# Patient Record
(demographics unavailable — no encounter records)

---

## 2024-10-10 NOTE — ASSESSMENT
[FreeTextEntry1] : Bee has an acute L3 compression fracture in the setting of L3-4 spinal stenosis.  She has a history of an L4-5 fusion.  We do lengthy talk today in the office.  She is obviously not a surgical candidate based on her recent cardiac events.  I am recommending we treat her with a orthosis.  I think with brace immobilization in time the fracture should go on to heal itself.  In addition to that I have prescribed her some tramadol to help with the pain.  We talked about heat ice and activity modification.  In terms of her overall lumbar spinal stenosis and claudication that something that we can deal with surgically in the future when she is an appropriate candidate.  She is comfortable this plan.  All of her questions were addressed.

## 2024-10-10 NOTE — PHYSICAL EXAM
[de-identified] : On exam she is healthy appearing but looks uncomfortable.  Sits and rises slowly.  Tenderness to percussion midline spine.  Range of motion about 50% of normal.  Grossly the strength in her legs is preserved. [de-identified] : AP and lateral lumbar spine x-rays demonstrate postsurgical changes at L4-5 with a solid fusion pedicle screws and interbody cage.  There is approximately 20% compression fracture of the superior endplate of L3 above.

## 2024-10-10 NOTE — HISTORY OF PRESENT ILLNESS
[de-identified] : Bee returns today for follow-up.  I saw her earlier this year at the old practice.  At the time of seeing her for back pain and bilateral thigh pain.  She was diagnosed with transitional stenosis at L3-4 having undergone a previous L4-5 fusion many years ago.  We are treating her conservatively.  She was considering surgical intervention when in May she had a heart attack and needed cardiac catheterization.  She is obviously not a candidate for any elective surgery at this point in time.  She is here today for a new complaint.  About 3 or 4 weeks ago she was helping a friend when she fell backwards landing on her back.  She had immediate onset of significant increase in pain in her back.  She was diagnosed with a L3 compression fracture.  She has been managing it with some oral medications but it has been persistently painful.  This was the first opportunity for evaluation.  Her pain is back pain.  No recent changes in her bowel or bladder habits.  No change in her thigh or groin pain.  No new numbness tingling or weakness in the lower extremities.

## 2024-11-22 NOTE — DATA REVIEWED
[FreeTextEntry1] : : CT LUMBAR SPINE  ORDER #: [id number] CC: ; ; ; End of cc's  CT LUMBAR SPINE  CLINICAL INFORMATION: pain to lumbar spine  TECHNIQUE: Noncontrast CT. Axial acquisition. Sagittal and coronal reformations.  COMPARISON: No prior studies for comparison  FINDINGS: SPINAL COLUMN: Straightening of the normal lumbar lordosis. Mild grade 1 retrolisthesis T11 on T12 and L3 on L4. Prior L4-5 laminectomy, discectomy, and posterior fusion/stabilization. Resection of the bilateral L4-5 facet joints. L4-5 disc prosthesis. Pedicle screws bilaterally at L4 and L5 attaching to short interlocking rods. Mild fracture superior endplate of L3 eccentric towards the right which appears to have an acute component.  DISC LEVELS: T11-12: Mild grade 1 retrolisthesis. No significant canal or neural foramina narrowing. T12/L1: No disc bulge or protrusion. No canal or neural foramina narrowing. L1-2: No disc bulge or protrusion. No canal or neural foramina narrowing. L2-3: Mild disc bulge slightly more focal in the right foraminal region. Mild canal and right greater than left lateral recess and neural foramina narrowing. L3-4: Mild grade 1 retrolisthesis. Diffuse bulge. Mild facet DJD. Moderate canal, moderate right and mild left neural foramina narrowing L4-5: Prior discectomy and laminectomy. Capacious spinal canal. No gross recurrent disc. L5-S1: Mild bulge. Moderate right greater than left facet DJD. Mild to moderate canal and mild left greater than right neural foramina narrowing  OTHER: Visualized sacrum appears intact.  IMPRESSION: Mild compression fracture superior endplate of L3 eccentric to the right which appears to have an acute component. Correlate with history and site of pain. Prior L4-5 laminectomy, discectomy, and posterior fusion/stabilization. Multilevel disc bulges as described. Please see report for detail  --- End of Report ---  Electronically Signed: ____________________________________________ Jarvis Guevara MD 09/30/24 [id number]

## 2024-11-22 NOTE — ASSESSMENT
[FreeTextEntry1] : Ms. LUZ MARIA MACDONALD is a 77 year F on BRLINITA suffering from right sided low back and thigh pain, that based upon today's subjective complaints, physical examination, and CT review, is likely multifactorial with element of L3 radiculopathy on the RIGHT as well as possible element due to compression fracture  >> Medications  Chronic opioid use for non-malignant pain, in particular at high doses would not be recommended since it can potentially lead to hyperalgesia (hypersensitivity), tolerance and addiction.    Not amenable to gabapentin - causes fatigue   >> Interventions   Consider for RIGHT L3/4 LESI pending MRI  needs clearance to HOLD Brilinta    >> Therapy and Other Modalities   Continue with daily home stretching regimen  >> Imaging and Other Studies  MRI Lumbar Spine  >> Consults  None ordered

## 2024-11-22 NOTE — HISTORY OF PRESENT ILLNESS
[Back Pain] : back pain [___ mths] : [unfilled] month(s) ago [Constant] : constant [8] : a maximum pain level of 8/10 [Sharp] : sharp [Dull] : dull [Aching] : aching [Throbbing] : throbbing [Shooting] : shooting [Electric] : electric [Sitting] : sitting [Standing] : standing [Walking] : walking [Transitioning] : transitioning [Bending] : bending [Lifting] : lifting [Laying] : laying [Medications] : medications [Heat] : heat [Rest] : rest [FreeTextEntry1] : HPI - Kandy Miguel, a 77-year-old female, on [place]RLINITA  presents today with a primary complaint of back pain, specifically localized to the lumbar spine following a fracture in September 2024. The pain has been persistent for the past three months and is described as constant. She rates her pain consistently at 8/10 on the pain scale, characterizing it as sharp, dull, aching, throbbing, shooting, and electric. The pain is exacerbated by activities such as sitting, standing, walking, transitioning, bending, and lifting. However, it is somewhat alleviated by laying down, taking medications like Tramadol for intense pain, heat application, and rest. She usually resorts to Tylenol for general pain management. Imaging has been done, and she was referred to this clinic by Dr. Whitfield.  The pain does not radiate into other areas, and she denies any numbness, weakness, bowel or bladder incontinence, or saddle/perineal anesthesia, indicating no red flag symptoms. She has maintained regular bowel movements. This ongoing pain significantly impacts her daily life, hindering her ability to perform routine housework and other activities. Despite undergoing six weeks of provider-directed treatments, including a stretching regimen, her symptoms have persisted and progressed over the last three months. This chronic pain has not previously been managed by a pain management specialist. The pain does not worsen at specific times of the day but remains a constant challenge, affecting her sleep and emotional well-being.  [FreeTextEntry7] : lumbars spine pain had a facture in september 2024 [FreeTextEntry4] : patient takes tramadol as needed for intense pain otherwise she will only take Tylenol. has had imaging done and was referred by dr. miller

## 2024-11-22 NOTE — PHYSICAL EXAM
[de-identified] : General Appearance: Level of consciousness: Alert Body habitus: Well-nourished Signs of distress: Some noticeable discomfort. Hygiene: Clean   Psychiatric: Appropriate mood and affect. Cooperative.   Skin: Nailbeds pink with no cyanosis or clubbing. Lesions or rashes: None observed   Head and Neck: The head is normocephalic and atraumatic Eyes: Conjunctivae  clear without exudates. Sclera is non-icteric Ears: No discharge. Hearing is intact with good acuity Nose: No discharge. No nasal flaring Mouth and throat: Trachea Midline, teeth and gums are without obvious lesion   Respiratory: Breathing pattern: Normal Chest movement: Symmetrical Use of accessory muscles: Absent Cough: Absent Wheezing: Absent   Cardiovascular System: Pulse: Regular Cyanosis: Absent   Abdomen: Inspection: No distention Visible pulsations: Absent   Musculoskeletal System: Muscle strength:   strength is normal bilaterally. Gait: Steady, NO assistive device Posture: Upright Rises from a seated position with SOME difficulty due to pain     Spine: No gross deformity or signs of trauma. Curvature of the cervical, thoracic, and lumbar spine are within normal limits. Spinous processes are midline. NO tenderness of spinous processes. Paraspinal musculature is NOT hypertonic NO discomfort is noted with flexion MILD discomfort is noted with extension No discomfort is noted with side-to-side rotation   Straight leg raise test is negative bilaterally.   Joint examination: No Swelling, No gross deformities.     Neurological System: Cranial nerves 2-12: Grossly intact Motor function: Moving all extremities against gravity Dorsi/plantar flexion is normal bilaterally. Sensation: No gross deficit to light touch

## 2024-12-23 NOTE — REASON FOR VISIT
[Home] : at home, [unfilled] , at the time of the visit. [Medical Office: ([facility])___] : at the medical office located in  [Patient] : the patient [Follow-Up Visit] : a follow-up pain management visit

## 2024-12-23 NOTE — HISTORY OF PRESENT ILLNESS
[FreeTextEntry1] : Interval Note:  Since last visit the pain intensity has persisted  UNABLE TO HOLD BRILINTA UNTIL MAY  Medication has been allowing patient to go about activities of daily living with less pain.  Moving bowels regularly. No recent falls.   Patient denies any bowel/bladder incontinence, no saddle/perineal anesthesia or any other red flag signs or symptoms.  ---  HPI - Kandy Miguel, a 77-year-old female, presents today with a primary complaint of back pain, specifically localized to the lumbar spine following a fracture in September 2024. The pain has been persistent for the past three months and is described as constant. She rates her pain consistently at 8/10 on the pain scale, characterizing it as sharp, dull, aching, throbbing, shooting, and electric. The pain is exacerbated by activities such as sitting, standing, walking, transitioning, bending, and lifting. However, it is somewhat alleviated by laying down, taking medications like Tramadol for intense pain, heat application, and rest. She usually resorts to Tylenol for general pain management. Imaging has been done, and she was referred to this clinic by Dr. Whitfield.  The pain does not radiate into other areas, and she denies any numbness, weakness, bowel or bladder incontinence, or saddle/perineal anesthesia, indicating no red flag symptoms. She has maintained regular bowel movements. This ongoing pain significantly impacts her daily life, hindering her ability to perform routine housework and other activities. Despite undergoing six weeks of provider-directed treatments, including a stretching regimen, her symptoms have persisted and progressed over the last three months. This chronic pain has not previously been managed by a pain management specialist. The pain does not worsen at specific times of the day but remains a constant challenge, affecting her sleep and emotional well-being.

## 2024-12-23 NOTE — DATA REVIEWED
[FreeTextEntry1] : PROCEDURE: MRI LUMBAR SPINE  ORDER #: [id number] CC: ; ; ; End of cc's  CLINICAL INFORMATION: Lower back pain. Pain across back with thigh pain and groin pain down right leg  ADDITIONAL CLINICAL INFORMATION: Not Applicable  TECHNIQUE: Multiplanar, multisequence MRI was performed of the lumbar spine. IV Contrast: NONE  PRIOR STUDIES: No priors available for comparison.  FINDINGS:  LOCALIZER: No additional findings. BONES: The patient is status post posterior fusion of L4 and L5 with interbody fusion at L5. The patient is also status post bilateral L5 laminectomies. There is mild to moderate chronic Schmorl's node at the superior endplate of L3 vertebral body. There is no acute fracture or bone marrow edema. There is diffuse throughout the bone marrow that may represent osteopenia. ALIGNMENT: There is trace retrolisthesis of L3 on L4. There is trace anterolisthesis of L4 on L5.. Disc spaces: There is loss of T2 signal within the L2/L3 and L3/L4 and L5/S1 disc is consistent with mild desiccation. SACROILIAC JOINTS/SACRUM: There is no sacral fracture. The SI joints are partially visualized but are intact. There is vacuum phenomenon and mild anterior osteophyte formation consistent with mild degenerative changes of the bilateral SI joints. CONUS AND CAUDA EQUINA: The distal cord and conus are normal in signal. Conus terminates at L1. VISUALIZED INTRAPELVIC/INTRA-ABDOMINAL SOFT TISSUES: There are a few small cystic lesion seen at the inferior pole of the right kidney. PARASPINAL SOFT TISSUES: Normal.   INDIVIDUAL LEVELS:  LOWER THORACIC SPINE: No spinal canal or neuroforaminal stenosis.  L1-L2: No spinal canal or neuroforaminal stenosis. L2-L3: There is a mild diffuse disc bulge with a superimposed right subarticular foraminal and far lateral broad-based disc protrusion flattening the ventral thecal sac and contacting the right descending L3 nerve root. There is mild right narrowing of the neural foramen. There is narrowing of the right lateral recess. The left neuroforamen is patent. The bilateral exiting nerve roots appear within normal limits. The constellation of findings is causing mild spinal canal stenosis. L3-L4: There is trace retrolisthesis with a superimposed diffuse disc bulge flattening the ventral thecal sac and in close proximity to the right L3 foraminal exiting nerve root. There is moderate to severe left foraminal narrowing. There is severe right foraminal narrowing. Constellation of findings is causing moderate to severe spinal canal stenosis. L4-L5: There is minimal unroofing of the posterior disc space due to the trace anterolisthesis. There is a minimal diffuse disc bulge contacting the ventral thecal sac. The bilateral neuroforamen are patent. The bilateral L4 foraminal exiting nerve roots are within normal limits. There has been decompression at this level. L5-S1: There is a minimal diffuse disc bulge flattening the ventral thecal sac. There is mild-to-moderate bilateral foraminal narrowing. There is moderate facet and ligamentous hypertrophy. The constellation of findings is causing mild spinal canal stenosis.  No spinal canal or neuroforaminal stenosis.   IMPRESSION:  Status post posterior fusion of L4 and L5 with interbody fusion at L5.  Status post bilateral L5 laminectomies. Mild to moderate chronic Schmorl's node at the superior endplate of L3 vertebral body. No acute fracture or bone marrow edema. Degenerative changes of the lumbar spine most notable at L3/L4. L2-L3 mild diffuse disc bulge with a superimposed right subarticular foraminal and far lateral broad-based disc protrusion contacting the right descending L3 nerve root. L2/L3 mild spinal canal stenosis. L3-L4 diffuse disc bulge flattening the ventral thecal sac and in close proximity to the right L3 foraminal exiting nerve root. L3/L4 moderate to severe spinal canal stenosis. L4-L5 minimal diffuse disc. There has been decompression at this level. L5-S1 minimal diffuse disc bulge L5/S1 mild spinal canal stenosis.  --- End of Report ---   Electronically Signed: ____________________________________________

## 2024-12-23 NOTE — PHYSICAL EXAM
[de-identified] : Physical Exam (Telemedicine):  Gen: AAOX3, NAD HEENT: PERRLA, EOMI, NCAT, NP Pulmonary: No Signs of Respiratory Distress MSK: AROM WFL, Limitations painful ROM Neurological: Grossly neurologically intact, Noted deficits none Gait: Normal, Non-antalgic, Sans AD Derm: No Rash, (-)Lesions, (-)Erythema, (-)Cyanosis  Psych: Calm, Cooperative, Conversational  Disclaimer: This is a limited examination for the purposes of conducting a telemedicine visit during the COVID-19 pandemic. Physical exam maneuvers were modified as necessary to allow patient to self perform. A focused physician physical examination will be performed during in person visits and should be referred to to determine need for further testing, interventions or degree of disability.

## 2024-12-23 NOTE — ASSESSMENT
[FreeTextEntry1] : Ms. LUZ MARIA MACDONALD is a 77 year F on BRLINIT[place] suffering from right sided low back and thigh pain, that based upon today's subjective complaints, physical examination, and CT review, is likely multifactorial with element of L3 radiculopathy on the RIGHT as well as possible element due to compression fracture  >> Medications  Chronic opioid use for non-malignant pain, in particular at high doses would not be recommended since it can potentially lead to hyperalgesia (hypersensitivity), tolerance and addiction.  Not amenable to gabapentin - causes fatigue   >> Interventions  Consider for RIGHT L3/4 LESI pending MRI - Unable to HOLD Brilinta until  >> Therapy and Other Modalities  Continue with daily home stretching regimen  >> Imaging and Other Studies  MRI Lumbar Spine  >> Consults  None ordered.

## 2025-01-13 NOTE — PHYSICAL EXAM
[de-identified] : General: AAOx3, NAD HEENT: EOMI, Sclera white CVS: +2 Pulses Pulmonary: No Respiratory Distress Abdomen: Soft, NT, ND MSK: Moving all extremities against gravity ++ TTP RIGHT and LEFT lumbar paraspinal muscles Neuro: Sensation I to LT Extremities: (-)Edema Derm: No Rash Psych: Calm, Cooperative

## 2025-01-13 NOTE — ASSESSMENT
[FreeTextEntry1] : Ms. LUZ MARIA MACDONALD is a 77 year F on BRLINIT[place] suffering from right sided low back and thigh pain, that based upon today's subjective complaints, physical examination, and CT review, is likely multifactorial with element of L3 radiculopathy on the RIGHT as well as possible element due to compression fracture  >> Medications  Chronic opioid use for non-malignant pain, in particular at high doses would not be recommended since it can potentially lead to hyperalgesia (hypersensitivity), tolerance and addiction.  Not amenable to gabapentin - causes fatigue   >> Interventions  Arrange for TPI  Consider for RIGHT L3/4 LESI pending MRI - Unable to HOLD Brilinta until  >> Therapy and Other Modalities  Continue with daily home stretching regimen  >> Imaging and Other Studies  I have personally reviewed the images in detail together with the patient today, and I have answered all questions regarding this condition to the best of my ability, to the patient's satisfaction.  >> Consults  None ordered.   --  PROCEDURE NOTE   PATIENT NAME: - LUZ MARIA MACDONALD     ATTENDING:  Danis Kraft DO   ASSISTANT:  None   PREPROCEDURE DIAGNOSIS:   Myalgia   POSTPROCEDURE DIAGNOSIS: Myalgia   PROCEDURE PERFORMED:  Trigger Point Injections - Number of Muscle Groups RIGHT and LEFT lumbar Paraspinal Muscles   CONTRAINDICATIONS: None   PROCEDURE IN DETAIL:  I explained the details of the procedure to the patient including the risks, benefits and alternatives. We had an informed discussion and the patient verbalized understanding and signed the consent form. All questions were answered appropriately.   The area was sterile prepped with chloraprep and draped. Next, the area with palpable trigger points was identified and included the  RIGHT and LEFT lumbar Paraspinal Muscles. A 25 G 1.5 in needle was used to enter multiple trigger points identified. After negative aspiration, each trigger point was injected with a mixture of 1 ml of Kenalog 40mg/ ml 5 ml of 0.25% Bupivacaine mixed with 4ml of 1% Lidocaine. A total of 10 ml was used.   COMPLICATIONS:  None   BLOOD LOSS: None         DISPOSITION: 1. Discharged home with instructions. 2. Follow up in the Pain Center in 2-4 weeks or on an as needed basis.

## 2025-01-13 NOTE — HISTORY OF PRESENT ILLNESS
[Back Pain] : back pain [FreeTextEntry1] : Interval Note:  Since last visit the pain intensity has persisted, worse in the lumbar spine  Recent ***   Medication has been allowing patient to go about activities of daily living with less pain.  Moving bowels regularly. No recent falls.   Patient denies any bowel/bladder incontinence, no saddle/perineal anesthesia or any other red flag signs or symptoms.     --  HPI - Kandy Miguel, a 77-year-old female, presents today with a primary complaint of back pain, specifically localized to the lumbar spine following a fracture in September 2024. The pain has been persistent for the past three months and is described as constant. She rates her pain consistently at 8/10 on the pain scale, characterizing it as sharp, dull, aching, throbbing, shooting, and electric. The pain is exacerbated by activities such as sitting, standing, walking, transitioning, bending, and lifting. However, it is somewhat alleviated by laying down, taking medications like Tramadol for intense pain, heat application, and rest. She usually resorts to Tylenol for general pain management. Imaging has been done, and she was referred to this clinic by Dr. Whitfield.  The pain does not radiate into other areas, and she denies any numbness, weakness, bowel or bladder incontinence, or saddle/perineal anesthesia, indicating no red flag symptoms. She has maintained regular bowel movements. This ongoing pain significantly impacts her daily life, hindering her ability to perform routine housework and other activities. Despite undergoing six weeks of provider-directed treatments, including a stretching regimen, her symptoms have persisted and progressed over the last three months. This chronic pain has not previously been managed by a pain management specialist. The pain does not worsen at specific times of the day but remains a constant challenge, affecting her sleep and emotional well-being.

## 2025-01-13 NOTE — DATA REVIEWED
[FreeTextEntry1] : PROCEDURE: MRI LUMBAR SPINE  ORDER #: [id number] CC: ; ; ; End of cc's  CLINICAL INFORMATION: Lower back pain. Pain across back with thigh pain and groin pain down right leg  ADDITIONAL CLINICAL INFORMATION: Not Applicable  TECHNIQUE: Multiplanar, multisequence MRI was performed of the lumbar spine. IV Contrast: NONE  PRIOR STUDIES: No priors available for comparison.  FINDINGS:  LOCALIZER: No additional findings. BONES: The patient is status post posterior fusion of L4 and L5 with interbody fusion at L5. The patient is also status post bilateral L5 laminectomies. There is mild to moderate chronic Schmorl's node at the superior endplate of L3 vertebral body. There is no acute fracture or bone marrow edema. There is diffuse throughout the bone marrow that may represent osteopenia. ALIGNMENT: There is trace retrolisthesis of L3 on L4. There is trace anterolisthesis of L4 on L5.. Disc spaces: There is loss of T2 signal within the L2/L3 and L3/L4 and L5/S1 disc is consistent with mild desiccation. SACROILIAC JOINTS/SACRUM: There is no sacral fracture. The SI joints are partially visualized but are intact. There is vacuum phenomenon and mild anterior osteophyte formation consistent with mild degenerative changes of the bilateral SI joints. CONUS AND CAUDA EQUINA: The distal cord and conus are normal in signal. Conus terminates at L1. VISUALIZED INTRAPELVIC/INTRA-ABDOMINAL SOFT TISSUES: There are a few small cystic lesion seen at the inferior pole of the right kidney. PARASPINAL SOFT TISSUES: Normal.   INDIVIDUAL LEVELS:  LOWER THORACIC SPINE: No spinal canal or neuroforaminal stenosis.  L1-L2: No spinal canal or neuroforaminal stenosis. L2-L3: There is a mild diffuse disc bulge with a superimposed right subarticular foraminal and far lateral broad-based disc protrusion flattening the ventral thecal sac and contacting the right descending L3 nerve root. There is mild right narrowing of the neural foramen. There is narrowing of the right lateral recess. The left neuroforamen is patent. The bilateral exiting nerve roots appear within normal limits. The constellation of findings is causing mild spinal canal stenosis. L3-L4: There is trace retrolisthesis with a superimposed diffuse disc bulge flattening the ventral thecal sac and in close proximity to the right L3 foraminal exiting nerve root. There is moderate to severe left foraminal narrowing. There is severe right foraminal narrowing. Constellation of findings is causing moderate to severe spinal canal stenosis. L4-L5: There is minimal unroofing of the posterior disc space due to the trace anterolisthesis. There is a minimal diffuse disc bulge contacting the ventral thecal sac. The bilateral neuroforamen are patent. The bilateral L4 foraminal exiting nerve roots are within normal limits. There has been decompression at this level. L5-S1: There is a minimal diffuse disc bulge flattening the ventral thecal sac. There is mild-to-moderate bilateral foraminal narrowing. There is moderate facet and ligamentous hypertrophy. The constellation of findings is causing mild spinal canal stenosis.  No spinal canal or neuroforaminal stenosis.   IMPRESSION:  Status post posterior fusion of L4 and L5 with interbody fusion at L5.  Status post bilateral L5 laminectomies. Mild to moderate chronic Schmorl's node at the superior endplate of L3 vertebral body. No acute fracture or bone marrow edema. Degenerative changes of the lumbar spine most notable at L3/L4. L2-L3 mild diffuse disc bulge with a superimposed right subarticular foraminal and far lateral broad-based disc protrusion contacting the right descending L3 nerve root. L2/L3 mild spinal canal stenosis. L3-L4 diffuse disc bulge flattening the ventral thecal sac and in close proximity to the right L3 foraminal exiting nerve root. L3/L4 moderate to severe spinal canal stenosis. L4-L5 minimal diffuse disc. There has been decompression at this level. L5-S1 minimal diffuse disc bulge L5/S1 mild spinal canal stenosis.  --- End of Report ---   Electronically Signed: ____________________________________________

## 2025-02-12 NOTE — HISTORY OF PRESENT ILLNESS
[FreeTextEntry1] : Interval Note:  Recent in office TPI helped the groin pain however the back pain persists  Since last visit the pain intensity has improved significantly, however still with LEFT sided low back pain  Medication has been allowing patient to go about activities of daily living with less pain.  Moving bowels regularly. No recent falls.   Patient denies any bowel/bladder incontinence, no saddle/perineal anesthesia or any other red flag signs or symptoms.   --  HPI - Kandy Miguel, a 77-year-old female, presents today with a primary complaint of back pain, specifically localized to the lumbar spine following a fracture in September 2024. The pain has been persistent for the past three months and is described as constant. She rates her pain consistently at 8/10 on the pain scale, characterizing it as sharp, dull, aching, throbbing, shooting, and electric. The pain is exacerbated by activities such as sitting, standing, walking, transitioning, bending, and lifting. However, it is somewhat alleviated by laying down, taking medications like Tramadol for intense pain, heat application, and rest. She usually resorts to Tylenol for general pain management. Imaging has been done, and she was referred to this clinic by Dr. Whitfield.  The pain does not radiate into other areas, and she denies any numbness, weakness, bowel or bladder incontinence, or saddle/perineal anesthesia, indicating no red flag symptoms. She has maintained regular bowel movements. This ongoing pain significantly impacts her daily life, hindering her ability to perform routine housework and other activities. Despite undergoing six weeks of provider-directed treatments, including a stretching regimen, her symptoms have persisted and progressed over the last three months. This chronic pain has not previously been managed by a pain management specialist. The pain does not worsen at specific times of the day but remains a constant challenge, affecting her sleep and emotional well-being.

## 2025-02-12 NOTE — REASON FOR VISIT
[Home] : at home, [unfilled] , at the time of the visit. [Medical Office: ([facility])___] : at the medical office located in  [Telehealth (audio & video)] : This visit was provided via telehealth using real-time 2-way audio visual technology. [Verbal consent obtained from patient] : the patient, [unfilled] [Follow-Up Visit] : a follow-up pain management visit

## 2025-02-12 NOTE — DATA REVIEWED
[FreeTextEntry1] : PROCEDURE: MRI LUMBAR SPINE  ORDER #: [id number] CC: ; ; ; End of cc's  CLINICAL INFORMATION: Lower back pain. Pain across back with thigh pain and groin pain down right leg  ADDITIONAL CLINICAL INFORMATION: Not Applicable  TECHNIQUE: Multiplanar, multisequence MRI was performed of the lumbar spine. IV Contrast: NONE  PRIOR STUDIES: No priors available for comparison.  FINDINGS:  LOCALIZER: No additional findings. BONES: The patient is status post posterior fusion of L4 and L5 with interbody fusion at L5. The patient is also status post bilateral L5 laminectomies. There is mild to moderate chronic Schmorl's node at the superior endplate of L3 vertebral body. There is no acute fracture or bone marrow edema. There is diffuse throughout the bone marrow that may represent osteopenia. ALIGNMENT: There is trace retrolisthesis of L3 on L4. There is trace anterolisthesis of L4 on L5.. Disc spaces: There is loss of T2 signal within the L2/L3 and L3/L4 and L5/S1 disc is consistent with mild desiccation. SACROILIAC JOINTS/SACRUM: There is no sacral fracture. The SI joints are partially visualized but are intact. There is vacuum phenomenon and mild anterior osteophyte formation consistent with mild degenerative changes of the bilateral SI joints. CONUS AND CAUDA EQUINA: The distal cord and conus are normal in signal. Conus terminates at L1. VISUALIZED INTRAPELVIC/INTRA-ABDOMINAL SOFT TISSUES: There are a few small cystic lesion seen at the inferior pole of the right kidney. PARASPINAL SOFT TISSUES: Normal.   INDIVIDUAL LEVELS:  LOWER THORACIC SPINE: No spinal canal or neuroforaminal stenosis.  L1-L2: No spinal canal or neuroforaminal stenosis. L2-L3: There is a mild diffuse disc bulge with a superimposed right subarticular foraminal and far lateral broad-based disc protrusion flattening the ventral thecal sac and contacting the right descending L3 nerve root. There is mild right narrowing of the neural foramen. There is narrowing of the right lateral recess. The left neuroforamen is patent. The bilateral exiting nerve roots appear within normal limits. The constellation of findings is causing mild spinal canal stenosis. L3-L4: There is trace retrolisthesis with a superimposed diffuse disc bulge flattening the ventral thecal sac and in close proximity to the right L3 foraminal exiting nerve root. There is moderate to severe left foraminal narrowing. There is severe right foraminal narrowing. Constellation of findings is causing moderate to severe spinal canal stenosis. L4-L5: There is minimal unroofing of the posterior disc space due to the trace anterolisthesis. There is a minimal diffuse disc bulge contacting the ventral thecal sac. The bilateral neuroforamen are patent. The bilateral L4 foraminal exiting nerve roots are within normal limits. There has been decompression at this level. L5-S1: There is a minimal diffuse disc bulge flattening the ventral thecal sac. There is mild-to-moderate bilateral foraminal narrowing. There is moderate facet and ligamentous hypertrophy. The constellation of findings is causing mild spinal canal stenosis.  No spinal canal or neuroforaminal stenosis.   IMPRESSION:  Status post posterior fusion of L4 and L5 with interbody fusion at L5.  Status post bilateral L5 laminectomies. Mild to moderate chronic Schmorl's node at the superior endplate of L3 vertebral body. No acute fracture or bone marrow edema. Degenerative changes of the lumbar spine most notable at L3/L4. L2-L3 mild diffuse disc bulge with a superimposed right subarticular foraminal and far lateral broad-based disc protrusion contacting the right descending L3 nerve root. L2/L3 mild spinal canal stenosis. L3-L4 diffuse disc bulge flattening the ventral thecal sac and in close proximity to the right L3 foraminal exiting nerve root. L3/L4 moderate to severe spinal canal stenosis. L4-L5 minimal diffuse disc. There has been decompression at this level. L5-S1 minimal diffuse disc bulge L5/S1 mild spinal canal stenosis.  --- End of Report ---   Electronically Signed: ____________________________________________

## 2025-02-12 NOTE — PHYSICAL EXAM
[de-identified] : Physical Exam (Telemedicine): Gen: AAOX3, NAD HEENT: PERRLA, EOMI, NCAT, NP Pulmonary: No Signs of Respiratory Distress MSK: AROM WFL, Limitations painful ROM (worse on LET) with truncal flexion/ extension Neurological: Grossly neurologically intact, Noted deficits none Gait: Normal, Non-antalgic, Sans AD Derm: No Rash, (-)Lesions, (-)Erythema, (-)Cyanosis Psych: Calm, Cooperative, Conversational   Disclaimer: This is a limited examination for the purposes of conducting a telemedicine. Physical exam maneuvers were modified as necessary to allow patient to self perform. A focused physician physical examination will be performed during in person visits and should be referred to to determine need for further testing, interventions or degree of disability.

## 2025-02-12 NOTE — ASSESSMENT
[FreeTextEntry1] : Ms. LUZ MARIA MACDONALD is a 77 year F on [place] suffering from right sided low back and thigh pain, that based upon today's subjective complaints, physical examination, and CT review, is likely multifactorial with element of L3 radiculopathy on the RIGHT as well as possible element due to compression fracture  >> Medications  Chronic opioid use for non-malignant pain, in particular at high doses would not be recommended since it can potentially lead to hyperalgesia (hypersensitivity), tolerance and addiction.  Not amenable to gabapentin - causes fatigue   >> Interventions  Consider for RIGHT L3/4 LESI pending MRI - Unable to HOLD Brilinta until MAY  >> Therapy and Other Modalities  PT 2x/week for 4 weeks for lumbar stenosis Treatment includes spinal rehab, strengthening of core, glutes, abductors, LS paraspinals, Quadratus Lumborum, and targeted myofascial release. Incorporate massage techniques to reduce muscle tension and promote circulation. Aerobic conditioning exercises to improve endurance. Stretching routines emphasizing hamstrings, hip flexors, and lumbar extensors. Modalities: US, Heat, TENS trial, and cold therapy if indicated. Manual traction may be applied for decompression. ROM exercises for maintaining joint flexibility. Precautions: Monitor for falls, adhere to universal safety guidelines, and ensure proper form during exercises   >> Imaging and Other Studies  I have personally reviewed the images in detail together with the patient today, and I have answered all questions regarding this condition to the best of my ability, to the patient's satisfaction.  >> Consults  None ordered.

## 2025-03-07 NOTE — PHYSICAL EXAM
[de-identified] : General: AAOx3, NAD HEENT: EOMI, Sclera white CVS: +2 Pulses Pulmonary: No Respiratory Distress Abdomen: Soft, NT, ND MSK: Moving all extremities against gravity ++ TTP Bilateral lumbar spine Neuro: Sensation I to LT Extremities: (-)Edema Derm: No Rash Psych: Calm, Cooperative

## 2025-03-07 NOTE — DATA REVIEWED
[FreeTextEntry1] : PROCEDURE: MRI LUMBAR SPINE  ORDER #: [id number] CC: ; ; ; End of cc's  CLINICAL INFORMATION: Lower back pain. Pain across back with thigh pain and groin pain down right leg  ADDITIONAL CLINICAL INFORMATION: Not Applicable  TECHNIQUE: Multiplanar, multisequence MRI was performed of the lumbar spine. IV Contrast: NONE  PRIOR STUDIES: No priors available for comparison.  FINDINGS:  LOCALIZER: No additional findings. BONES: The patient is status post posterior fusion of L4 and L5 with interbody fusion at L5. The patient is also status post bilateral L5 laminectomies. There is mild to moderate chronic Schmorl's node at the superior endplate of L3 vertebral body. There is no acute fracture or bone marrow edema. There is diffuse throughout the bone marrow that may represent osteopenia. ALIGNMENT: There is trace retrolisthesis of L3 on L4. There is trace anterolisthesis of L4 on L5.. Disc spaces: There is loss of T2 signal within the L2/L3 and L3/L4 and L5/S1 disc is consistent with mild desiccation. SACROILIAC JOINTS/SACRUM: There is no sacral fracture. The SI joints are partially visualized but are intact. There is vacuum phenomenon and mild anterior osteophyte formation consistent with mild degenerative changes of the bilateral SI joints. CONUS AND CAUDA EQUINA: The distal cord and conus are normal in signal. Conus terminates at L1. VISUALIZED INTRAPELVIC/INTRA-ABDOMINAL SOFT TISSUES: There are a few small cystic lesion seen at the inferior pole of the right kidney. PARASPINAL SOFT TISSUES: Normal.   INDIVIDUAL LEVELS:  LOWER THORACIC SPINE: No spinal canal or neuroforaminal stenosis.  L1-L2: No spinal canal or neuroforaminal stenosis. L2-L3: There is a mild diffuse disc bulge with a superimposed right subarticular foraminal and far lateral broad-based disc protrusion flattening the ventral thecal sac and contacting the right descending L3 nerve root. There is mild right narrowing of the neural foramen. There is narrowing of the right lateral recess. The left neuroforamen is patent. The bilateral exiting nerve roots appear within normal limits. The constellation of findings is causing mild spinal canal stenosis. L3-L4: There is trace retrolisthesis with a superimposed diffuse disc bulge flattening the ventral thecal sac and in close proximity to the right L3 foraminal exiting nerve root. There is moderate to severe left foraminal narrowing. There is severe right foraminal narrowing. Constellation of findings is causing moderate to severe spinal canal stenosis. L4-L5: There is minimal unroofing of the posterior disc space due to the trace anterolisthesis. There is a minimal diffuse disc bulge contacting the ventral thecal sac. The bilateral neuroforamen are patent. The bilateral L4 foraminal exiting nerve roots are within normal limits. There has been decompression at this level. L5-S1: There is a minimal diffuse disc bulge flattening the ventral thecal sac. There is mild-to-moderate bilateral foraminal narrowing. There is moderate facet and ligamentous hypertrophy. The constellation of findings is causing mild spinal canal stenosis.  No spinal canal or neuroforaminal stenosis.   IMPRESSION:  Status post posterior fusion of L4 and L5 with interbody fusion at L5.  Status post bilateral L5 laminectomies. Mild to moderate chronic Schmorl's node at the superior endplate of L3 vertebral body. No acute fracture or bone marrow edema. Degenerative changes of the lumbar spine most notable at L3/L4. L2-L3 mild diffuse disc bulge with a superimposed right subarticular foraminal and far lateral broad-based disc protrusion contacting the right descending L3 nerve root. L2/L3 mild spinal canal stenosis. L3-L4 diffuse disc bulge flattening the ventral thecal sac and in close proximity to the right L3 foraminal exiting nerve root. L3/L4 moderate to severe spinal canal stenosis. L4-L5 minimal diffuse disc. There has been decompression at this level. L5-S1 minimal diffuse disc bulge L5/S1 mild spinal canal stenosis.  --- End of Report ---   Electronically Signed: ____________________________________________

## 2025-03-07 NOTE — HISTORY OF PRESENT ILLNESS
[FreeTextEntry1] : Interval Note:  Ongoing low back pain , would like to revisit TPI which significantly helped in Jan, this year.    Medication has been allowing patient to go about activities of daily living with less pain.  Moving bowels regularly. No recent falls.   Patient denies any bowel/bladder incontinence, no saddle/perineal anesthesia or any other red flag signs or symptoms.  ---  TPI 1/10/25 - 80% relif x 4 weeks --  HPI - Kandy Miguel, a 77-year-old female, presents today with a primary complaint of back pain, specifically localized to the lumbar spine following a fracture in September 2024. The pain has been persistent for the past three months and is described as constant. She rates her pain consistently at 8/10 on the pain scale, characterizing it as sharp, dull, aching, throbbing, shooting, and electric. The pain is exacerbated by activities such as sitting, standing, walking, transitioning, bending, and lifting. However, it is somewhat alleviated by laying down, taking medications like Tramadol for intense pain, heat application, and rest. She usually resorts to Tylenol for general pain management. Imaging has been done, and she was referred to this clinic by Dr. Whitfield.  The pain does not radiate into other areas, and she denies any numbness, weakness, bowel or bladder incontinence, or saddle/perineal anesthesia, indicating no red flag symptoms. She has maintained regular bowel movements. This ongoing pain significantly impacts her daily life, hindering her ability to perform routine housework and other activities. Despite undergoing six weeks of provider-directed treatments, including a stretching regimen, her symptoms have persisted and progressed over the last three months. This chronic pain has not previously been managed by a pain management specialist. The pain does not worsen at specific times of the day but remains a constant challenge, affecting her sleep and emotional well-being.

## 2025-03-07 NOTE — ASSESSMENT
[FreeTextEntry1] : Ms. LUZ MARIA MACDONALD is a 77 year F on [place] suffering from right sided low back and thigh pain, that based upon today's subjective complaints, physical examination, and CT review, is likely multifactorial with element of L3 radiculopathy on the RIGHT as well as possible element due to compression fracture  >> Medications  Chronic opioid use for non-malignant pain, in particular at high doses would not be recommended since it can potentially lead to hyperalgesia (hypersensitivity), tolerance and addiction.  Not amenable to gabapentin - causes fatigue   >> Interventions  Arrange for TPI  Consider for RIGHT L3/4 LESI pending MRI - Unable to HOLD Brilinta until  >> Therapy and Other Modalities  diagnosis: cervical spondylosis  periscapular and scapulothoracic stretching and strengthening teach home exercise regimen massage and myofascial release increase ROM, strengthening, postural training, other modalities ad jonnathan physical therapy - 2x weekly / 6 weeks Precautions - universal safety, falls  >> Imaging and Other Studies  XRAY Cervical Spine  I have personally reviewed the images in detail together with the patient today, and I have answered all questions regarding this condition to the best of my ability, to the patient's satisfaction.  >> Consults  None ordered.   --  PROCEDURE NOTE   PATIENT NAME: - LUZ MARIA MACDONALD     ATTENDING:  Danis Kraft DO   ASSISTANT:  None   PREPROCEDURE DIAGNOSIS:   Myalgia   POSTPROCEDURE DIAGNOSIS: Myalgia   PROCEDURE PERFORMED:  Trigger Point Injections - Number of Muscle Groups RIGHT and LEFT lumbar Paraspinal Muscles   CONTRAINDICATIONS: None   PROCEDURE IN DETAIL:  I explained the details of the procedure to the patient including the risks, benefits and alternatives. We had an informed discussion and the patient verbalized understanding and signed the consent form. All questions were answered appropriately.   The area was sterile prepped with chloraprep and draped. Next, the area with palpable trigger points was identified and included the  RIGHT and LEFT lumbar Paraspinal Muscles. A 25 G 1.5 in needle was used to enter multiple trigger points identified. After negative aspiration, each trigger point was injected with a mixture of 1 ml of Kenalog 40mg/ ml 5 ml of 0.25% Bupivacaine mixed with 4ml of 1% Lidocaine. A total of 10 ml was used.   COMPLICATIONS:  None   BLOOD LOSS: None         DISPOSITION: 1. Discharged home with instructions. 2. Follow up in the Pain Center in 2-4 weeks or on an as needed basis.

## 2025-04-17 NOTE — HISTORY OF PRESENT ILLNESS
[FreeTextEntry1] : Interval Note:   Has cardiac cath planned for SOB that as been persisting   Completed XRAYS for review  Medication has been allowing patient to go about activities of daily living with less pain.  Moving bowels regularly. No recent falls.   Patient denies any bowel/bladder incontinence, no saddle/perineal anesthesia or any other red flag signs or symptoms.  ---  TPI 1/10/25 - 80% relif x 4 weeks --  HPI - Kandy Miguel, a 77-year-old female, presents today with a primary complaint of back pain, specifically localized to the lumbar spine following a fracture in September 2024. The pain has been persistent for the past three months and is described as constant. She rates her pain consistently at 8/10 on the pain scale, characterizing it as sharp, dull, aching, throbbing, shooting, and electric. The pain is exacerbated by activities such as sitting, standing, walking, transitioning, bending, and lifting. However, it is somewhat alleviated by laying down, taking medications like Tramadol for intense pain, heat application, and rest. She usually resorts to Tylenol for general pain management. Imaging has been done, and she was referred to this clinic by Dr. Whitfield.  The pain does not radiate into other areas, and she denies any numbness, weakness, bowel or bladder incontinence, or saddle/perineal anesthesia, indicating no red flag symptoms. She has maintained regular bowel movements. This ongoing pain significantly impacts her daily life, hindering her ability to perform routine housework and other activities. Despite undergoing six weeks of provider-directed treatments, including a stretching regimen, her symptoms have persisted and progressed over the last three months. This chronic pain has not previously been managed by a pain management specialist. The pain does not worsen at specific times of the day but remains a constant challenge, affecting her sleep and emotional well-being.

## 2025-04-17 NOTE — DATA REVIEWED
[FreeTextEntry1] : XR C SPINE 5 VIEWS (COMMON) Order#:       [id number]    CLINICAL HISTORY: Myalgia.   Cervical Spine.   Frontal, lateral (neutral, flexion and extension), right and left oblique and open mouth projections demonstrate a 2 mm retrolisthesis of C4 on C5.. No fracture or vertebral compression can be appreciated. No prevertebral soft tissue swelling or abnormal splaying of the spinous processes is noted. There is degenerative disc disease and spondylosis deformans at the C4-5, C5-6 and C6-7 levels.. No destructive bony lesion is noted. Some encroachment on the right exiting neural foramen at the C4-5 is noted. No hypermobility with flexion or extension is identified.   IMPRESSION: 2 mm retrolisthesis of C4 on C5.  Degenerative disc disease and spondylosis deformans at the C4-5, C5-6 and C6-7 levels  PROCEDURE: MRI L   --UMBAR SPINE  ORDER #: [id number] CC: ; ; ; End of cc's  CLINICAL INFORMATION: Lower back pain . Pain across back with thigh pain and groin pain down right leg  ADDITIONAL CLINICAL INFORMATION: Not Applicable  TECHNIQUE: Multiplanar, multisequence MRI was performed of the lumbar spine. IV Contrast: NONE  PRIOR STUDIES: No priors available for comparison.  FINDINGS:  LOCALIZER: No additional findings. BONES: The patient is status post posterior fusion of L4 and L5 with interbody fusion at L5. The patient is also status post bilateral L5 laminectomies. There is mild to moderate chronic Schmorl's node at the superior endplate of L3 vertebral body. There is no acute fracture or bone marrow edema. There is diffuse throughout the bone marrow that may represent osteopenia. ALIGNMENT: There is trace retrolisthesis of L3 on L4. There is trace anterolisthesis of L4 on L5.. Disc spaces: There is loss of T2 signal within the L2/L3 and L3/L4 and L5/S1 disc is consistent with mild desiccation. SACROILIAC JOINTS/SACRUM: There is no sacral fracture. The SI joints are partially visualized but are intact. There is vacuum phenomenon and mild anterior osteophyte formation consistent with mild degenerative changes of the bilateral SI joints. CONUS AND CAUDA EQUINA: The distal cord and conus are normal in signal. Conus terminates at L1. VISUALIZED INTRAPELVIC/INTRA-ABDOMINAL SOFT TISSUES: There are a few small cystic lesion seen at the inferior pole of the right kidney. PARASPINAL SOFT TISSUES: Normal.   INDIVIDUAL LEVELS:  LOWER THORACIC SPINE: No spinal canal or neuroforaminal stenosis.  L1-L2: No spinal canal or neuroforaminal stenosis. L2-L3: There is a mild diffuse disc bulge with a superimposed right subarticular foraminal and far lateral broad-based disc protrusion flattening the ventral thecal sac and contacting the right descending L3 nerve root. There is mild right narrowing of the neural foramen. There is narrowing of the right lateral recess. The left neuroforamen is patent. The bilateral exiting nerve roots appear within normal limits. The constellation of findings is causing mild spinal canal stenosis. L3-L4: There is trace retrolisthesis with a superimposed diffuse disc bulge flattening the ventral thecal sac and in close proximity to the right L3 foraminal exiting nerve root. There is moderate to severe left foraminal narrowing. There is severe right foraminal narrowing. Constellation of findings is causing moderate to severe spinal canal stenosis. L4-L5: There is minimal unroofing of the posterior disc space due to the trace anterolisthesis. There is a minimal diffuse disc bulge contacting the ventral thecal sac. The bilateral neuroforamen are patent. The bilateral L4 foraminal exiting nerve roots are within normal limits. There has been decompression at this level. L5-S1: There is a minimal diffuse disc bulge flattening the ventral thecal sac. There is mild-to-moderate bilateral foraminal narrowing. There is moderate facet and ligamentous hypertrophy. The constellation of findings is causing mild spinal canal stenosis.  No spinal canal or neuroforaminal stenosis.   IMPRESSION:  Status post posterior fusion of L4 and L5 with interbody fusion at L5.  Status post bilateral L5 laminectomies. Mild to moderate chronic Schmorl's node at the superior endplate of L3 vertebral body. No acute fracture or bone marrow edema. Degenerative changes of the lumbar spine most notable at L3/L4. L2-L3 mild diffuse disc bulge with a superimposed right subarticular foraminal and far lateral broad-based disc protrusion contacting the right descending L3 nerve root. L2/L3 mild spinal canal stenosis. L3-L4 diffuse disc bulge flattening the ventral thecal sac and in close proximity to the right L3 foraminal exiting nerve root. L3/L4 moderate to severe spinal canal stenosis. L4-L5 minimal diffuse disc. There has been decompression at this level. L5-S1 minimal diffuse disc bulge L5/S1 mild spinal canal stenosis.  --- End of Report ---   Electronically Signed: ____________________________________________

## 2025-04-17 NOTE — PHYSICAL EXAM
[de-identified] : General: AAOx3, NAD HEENT: EOMI, Sclera white CVS: +2 Pulses Pulmonary: No Respiratory Distress Abdomen: Soft, NT, ND MSK: Moving all extremities against gravity ++ TTP Bilateral lumbar spine Neuro: Sensation I to LT Extremities: (-)Edema Derm: No Rash Psych: Calm, Cooperative

## 2025-04-17 NOTE — DATA REVIEWED
[FreeTextEntry1] : XR C SPINE 5 VIEWS (COMMON) Order#:       [id number]    CLINICAL HISTORY: Myalgia.   Cervical Spine.   Frontal, lateral (neutral, flexion and extension), right and left oblique and open mouth projections demonstrate a 2 mm retrolisthesis of C4 on C5.. No fracture or vertebral compression can be appreciated. No prevertebral soft tissue swelling or abnormal splaying of the spinous processes is noted. There is degenerative disc disease and spondylosis deformans at the C4-5, C5-6 and C6-7 levels.. No destructive bony lesion is noted. Some encroachment on the right exiting neural foramen at the C4-5 is noted. No hypermobility with flexion or extension is identified.   IMPRESSION: 2 mm retrolisthesis of C4 on C5.  Degenerative disc disease and spondylosis deformans at the C4-5, C5-6 and C6-7 levels  PROCEDURE: MRI L   --UMBAR SPINE  ORDER #: [id number] CC: ; ; ; End of cc's  CLINICAL INFORMATION: Lower back pain . Pain across back with thigh pain and groin pain down right leg  ADDITIONAL CLINICAL INFORMATION: Not Applicable  TECHNIQUE: Multiplanar, multisequence MRI was performed of the lumbar spine. IV Contrast: NONE  PRIOR STUDIES: No priors available for comparison.  FINDINGS:  LOCALIZER: No additional findings. BONES: The patient is status post posterior fusion of L4 and L5 with interbody fusion at L5. The patient is also status post bilateral L5 laminectomies. There is mild to moderate chronic Schmorl's node at the superior endplate of L3 vertebral body. There is no acute fracture or bone marrow edema. There is diffuse throughout the bone marrow that may represent osteopenia. ALIGNMENT: There is trace retrolisthesis of L3 on L4. There is trace anterolisthesis of L4 on L5.. Disc spaces: There is loss of T2 signal within the L2/L3 and L3/L4 and L5/S1 disc is consistent with mild desiccation. SACROILIAC JOINTS/SACRUM: There is no sacral fracture. The SI joints are partially visualized but are intact. There is vacuum phenomenon and mild anterior osteophyte formation consistent with mild degenerative changes of the bilateral SI joints. CONUS AND CAUDA EQUINA: The distal cord and conus are normal in signal. Conus terminates at L1. VISUALIZED INTRAPELVIC/INTRA-ABDOMINAL SOFT TISSUES: There are a few small cystic lesion seen at the inferior pole of the right kidney. PARASPINAL SOFT TISSUES: Normal.   INDIVIDUAL LEVELS:  LOWER THORACIC SPINE: No spinal canal or neuroforaminal stenosis.  L1-L2: No spinal canal or neuroforaminal stenosis. L2-L3: There is a mild diffuse disc bulge with a superimposed right subarticular foraminal and far lateral broad-based disc protrusion flattening the ventral thecal sac and contacting the right descending L3 nerve root. There is mild right narrowing of the neural foramen. There is narrowing of the right lateral recess. The left neuroforamen is patent. The bilateral exiting nerve roots appear within normal limits. The constellation of findings is causing mild spinal canal stenosis. L3-L4: There is trace retrolisthesis with a superimposed diffuse disc bulge flattening the ventral thecal sac and in close proximity to the right L3 foraminal exiting nerve root. There is moderate to severe left foraminal narrowing. There is severe right foraminal narrowing. Constellation of findings is causing moderate to severe spinal canal stenosis. L4-L5: There is minimal unroofing of the posterior disc space due to the trace anterolisthesis. There is a minimal diffuse disc bulge contacting the ventral thecal sac. The bilateral neuroforamen are patent. The bilateral L4 foraminal exiting nerve roots are within normal limits. There has been decompression at this level. L5-S1: There is a minimal diffuse disc bulge flattening the ventral thecal sac. There is mild-to-moderate bilateral foraminal narrowing. There is moderate facet and ligamentous hypertrophy. The constellation of findings is causing mild spinal canal stenosis.  No spinal canal or neuroforaminal stenosis.   IMPRESSION:  Status post posterior fusion of L4 and L5 with interbody fusion at L5.  Status post bilateral L5 laminectomies. Mild to moderate chronic Schmorl's node at the superior endplate of L3 vertebral body. No acute fracture or bone marrow edema. Degenerative changes of the lumbar spine most notable at L3/L4. L2-L3 mild diffuse disc bulge with a superimposed right subarticular foraminal and far lateral broad-based disc protrusion contacting the right descending L3 nerve root. L2/L3 mild spinal canal stenosis. L3-L4 diffuse disc bulge flattening the ventral thecal sac and in close proximity to the right L3 foraminal exiting nerve root. L3/L4 moderate to severe spinal canal stenosis. L4-L5 minimal diffuse disc. There has been decompression at this level. L5-S1 minimal diffuse disc bulge L5/S1 mild spinal canal stenosis.  --- End of Report ---   Electronically Signed: ____________________________________________

## 2025-04-17 NOTE — PHYSICAL EXAM
[de-identified] : General: AAOx3, NAD HEENT: EOMI, Sclera white CVS: +2 Pulses Pulmonary: No Respiratory Distress Abdomen: Soft, NT, ND MSK: Moving all extremities against gravity ++ TTP Bilateral lumbar spine Neuro: Sensation I to LT Extremities: (-)Edema Derm: No Rash Psych: Calm, Cooperative

## 2025-04-17 NOTE — ASSESSMENT
[FreeTextEntry1] : Ms. LUZ MARIA MACDONALD is a 77 year F on BRLINITA suffering from right sided low back and thigh pain, that based upon today's subjective complaints, physical examination, and CT review, is likely multifactorial with element of L3 radiculopathy on the RIGHT as well as possible element due to compression fracture  >> Medications  Chronic opioid use for non-malignant pain, in particular at high doses would not be recommended since it can potentially lead to hyperalgesia (hypersensitivity), tolerance and addiction.  Not amenable to gabapentin - causes fatigue  >> Interventions  Consider for RIGHT L3/4 LESI pending MRI - Unable to HOLD Brilinta until  >> Therapy and Other Modalities  HOLD PT  >> Imaging and Other Studies   I have personally reviewed the images in detail together with the patient today, and I have answered all questions regarding this condition to the best of my ability, to the patient's satisfaction.  >> Consults  None ordered.

## 2025-05-21 NOTE — REASON FOR VISIT
[Home] : at home, [unfilled] , at the time of the visit. [Medical Office: ([facility])___] : at the medical office located in  [Follow-Up Visit] : a follow-up pain management visit [This encounter was initiated by telehealth (audio with video) and converted to telephone (audio only)] : This encounter was initiated by telehealth (audio with video) and converted to telephone (audio only) [Patient preference] : patient preference.

## 2025-05-21 NOTE — ASSESSMENT
[FreeTextEntry1] : Ms. LUZ MARIA MACDONALD is a 77 year F on BRLINITA suffering from right sided low back and thigh pain, that based upon today's subjective complaints, physical examination, and CT review, is likely multifactorial with element of L3 radiculopathy on the RIGHT as well as possible element due to compression fracture  >> Medications  Chronic opioid use for non-malignant pain, in particular at high doses would not be recommended since it can potentially lead to hyperalgesia (hypersensitivity), tolerance and addiction.  Not amenable to gabapentin - causes fatigue  >> Interventions  Arrange for in office injection / TPI  Consider for RIGHT L3/4 LESI pending MRI -  Unable to HOLD Brilinta x 5 days until MAY  >> Therapy and Other Modalities  HOLD PT  >> Imaging and Other Studies   I have personally reviewed the images in detail together with the patient today, and I have answered all questions regarding this condition to the best of my ability, to the patient's satisfaction.  >> Consults  None ordered.

## 2025-05-21 NOTE — REVIEW OF SYSTEMS
[Back Pain] : back pain [Radiating Pain] : radiating pain [Joint Stiffness] : joint stiffness [Decreased ROM] : decreased range of motion

## 2025-05-21 NOTE — PHYSICAL EXAM
[de-identified] : Physical Exam (Telemedicine): Gen: AAOX3, NAD HEENT: PERRLA, EOMI, NCAT, NP Pulmonary: No Signs of Respiratory Distress MSK: AROM WFL, Limitations painful ROM Neurological: Grossly neurologically intact, Noted deficits none Gait: Normal, Non-antalgic, Sans AD Derm: No Rash, (-)Lesions, (-)Erythema, (-)Cyanosis Psych: Calm, Cooperative, Conversational   Disclaimer: This is a limited examination for the purposes of conducting a telemedicine. Physical exam maneuvers were modified as necessary to allow patient to self perform. A focused physician physical examination will be performed during in person visits and should be referred to to determine need for further testing, interventions or degree of disability.

## 2025-05-21 NOTE — HISTORY OF PRESENT ILLNESS
[FreeTextEntry1] : Interval Note:  Had recent fall while on vacation several weeks ago, low back and leg pain progressed, now with RIGHT lower ext pain - NEW MRI PENDING  Remains on BRLINITA   Moving bowels regularly. No recent falls.   Patient denies any bowel/bladder incontinence, no saddle/perineal anesthesia or any other red flag signs or symptoms.  ---  TPI 1/10/25 - 80% relif x 4 weeks --  HPI - Kandy Miguel, a 77-year-old female, presents today with a primary complaint of back pain, specifically localized to the lumbar spine following a fracture in September 2024. The pain has been persistent for the past three months and is described as constant. She rates her pain consistently at 8/10 on the pain scale, characterizing it as sharp, dull, aching, throbbing, shooting, and electric. The pain is exacerbated by activities such as sitting, standing, walking, transitioning, bending, and lifting. However, it is somewhat alleviated by laying down, taking medications like Tramadol for intense pain, heat application, and rest. She usually resorts to Tylenol for general pain management. Imaging has been done, and she was referred to this clinic by Dr. Whitfield.  The pain does not radiate into other areas, and she denies any numbness, weakness, bowel or bladder incontinence, or saddle/perineal anesthesia, indicating no red flag symptoms. She has maintained regular bowel movements. This ongoing pain significantly impacts her daily life, hindering her ability to perform routine housework and other activities. Despite undergoing six weeks of provider-directed treatments, including a stretching regimen, her symptoms have persisted and progressed over the last three months. This chronic pain has not previously been managed by a pain management specialist. The pain does not worsen at specific times of the day but remains a constant challenge, affecting her sleep and emotional well-being.

## 2025-06-04 NOTE — PHYSICAL EXAM
[de-identified] : Grossly her strength is preserved.  Light touch sensations intact.  Hip range of motion is painless.  Straight leg raising is negative. [de-identified] : I independently reviewed an MRI scan of the lumbar spine done at [facility].  It demonstrates severe stenosis at L3-4,  There is bilateral foraminal stenosis worse on the right than the left.  This is the level above her prior L4-5 fusion.  The remaining discs all look pretty healthy.  There is some mild degenerative changes at L5-S1 but no significant stenosis.

## 2025-06-04 NOTE — HISTORY OF PRESENT ILLNESS
[de-identified] : Kandy returns today for follow-up.  Since I saw her last, she had a heart attack and she had a stent placed.  She recently had a cardiac catheterization which showed that everything was patent and her cardiologist that she is doing well from that perspective.  She has been traveling, she was in [place] a few weeks ago the right leg gave out on her and she fell to the ground.  Fortunately she did not sustain any injuries.  She does have a history of a L3 compression fracture which happened last fall.  Her main complaint is back and bilateral leg pain.  Its much worse on the right than the left.

## 2025-06-04 NOTE — ASSESSMENT
[FreeTextEntry1] : We do lengthy talk today in the office.  Based on her symptoms and the MRI findings I think she is a good candidate for surgery.  She would need a laminectomy and extension of her fusion at L3-4.  We talked about the surgery today in the office.  We talked about the perioperative course and events.  She would like some time to think about this.  In the meantime she is going to follow-up for epidural injections.  When she comes to decision, she will give me a call and execute the plan as indicated.  All questions addressed.

## 2025-06-27 NOTE — HISTORY OF PRESENT ILLNESS
[FreeTextEntry1] : Interval Note:  Persistent pain. Was advisewd for surgery Dr Whitfield likely in the autumn  Remains on BRLINITA   Moving bowels regularly. No recent falls.   Patient denies any bowel/bladder incontinence, no saddle/perineal anesthesia or any other red flag signs or symptoms.  ---  TPI 1/10/25 - 80% relif x 4 weeks --  HPI - Kandy Miguel, a 77-year-old female, presents today with a primary complaint of back pain, specifically localized to the lumbar spine following a fracture in September 2024. The pain has been persistent for the past three months and is described as constant. She rates her pain consistently at 8/10 on the pain scale, characterizing it as sharp, dull, aching, throbbing, shooting, and electric. The pain is exacerbated by activities such as sitting, standing, walking, transitioning, bending, and lifting. However, it is somewhat alleviated by laying down, taking medications like Tramadol for intense pain, heat application, and rest. She usually resorts to Tylenol for general pain management. Imaging has been done, and she was referred to this clinic by Dr. Whitfield.  The pain does not radiate into other areas, and she denies any numbness, weakness, bowel or bladder incontinence, or saddle/perineal anesthesia, indicating no red flag symptoms. She has maintained regular bowel movements. This ongoing pain significantly impacts her daily life, hindering her ability to perform routine housework and other activities. Despite undergoing six weeks of provider-directed treatments, including a stretching regimen, her symptoms have persisted and progressed over the last three months. This chronic pain has not previously been managed by a pain management specialist. The pain does not worsen at specific times of the day but remains a constant challenge, affecting her sleep and emotional well-being.

## 2025-06-27 NOTE — ASSESSMENT
[FreeTextEntry1] : Ms. LUZ MARIA MACDONALD is a 77 year F on BRLINITA suffering from right sided low back and thigh pain, that based upon today's subjective complaints, physical examination, and CT review, is likely multifactorial with element of L3 radiculopathy on the RIGHT as well as possible element due to compression fracture  >> Medications  Chronic opioid use for non-malignant pain, in particular at high doses would not be recommended since it can potentially lead to hyperalgesia (hypersensitivity), tolerance and addiction.  Not amenable to gabapentin - causes fatigue  >> Interventions  Arrange for tpi - see procedure note below  Consider for RIGHT L3/4 LESI pending MRI - Needs clearance to HOLD BRILINTA  >> Therapy and Other Modalities  PT 2x/week for 4 weeks for lumbar stenosis Treatment includes spinal rehab, strengthening of core, glutes, abductors, LS paraspinals, Quadratus Lumborum, and targeted myofascial release. Incorporate massage techniques to reduce muscle tension and promote circulation. Aerobic conditioning exercises to improve endurance. Stretching routines emphasizing hamstrings, hip flexors, and lumbar extensors. Modalities: US, Heat, TENS trial, and cold therapy if indicated. Manual traction may be applied for decompression. ROM exercises for maintaining joint flexibility. Precautions: Monitor for falls, adhere to universal safety guidelines, and ensure proper form during exercises  >> Imaging and Other Studies   I have personally reviewed the images in detail together with the patient today, and I have answered all questions regarding this condition to the best of my ability, to the patient's satisfaction.  >> Consults  None ordered.  --  PROCEDURE NOTE  PATIENT NAME: - LUZ MARIA MACDONALD   ATTENDING: Danis Kraft DO  ASSISTANT: None  PREPROCEDURE DIAGNOSIS: Myalgia  POSTPROCEDURE DIAGNOSIS: Myalgia  PROCEDURE PERFORMED: Trigger Point Injections - Number of Muscle Groups RIGHT and LEFT lumbar Paraspinal Muscles  CONTRAINDICATIONS: None  PROCEDURE IN DETAIL: I explained the details of the procedure to the patient including the risks, benefits and alternatives. We had an informed discussion and the patient verbalized understanding and signed the consent form. All questions were answered appropriately.  The area was sterile prepped with chloraprep and draped. Next, the area with palpable trigger points was identified and included the RIGHT and LEFT lumbar Paraspinal Muscles. A 25 G 1.5 in needle was used to enter multiple trigger points identified. After negative aspiration, each trigger point was injected with a mixture of 1 ml of Kenalog 40mg/ ml 5 ml of 0.25% Bupivacaine mixed with 4ml of 1% Lidocaine. A total of 10 ml was used.  COMPLICATIONS: None  BLOOD LOSS: None  DISPOSITION: 1. Discharged home with instructions. 2. Follow up in the Pain Center in 2-4 weeks or on an as needed basis.

## 2025-06-27 NOTE — PHYSICAL EXAM
[de-identified] : General: AAOx3, NAD HEENT: EOMI, Sclera white CVS: +2 Pulses Pulmonary: No Respiratory Distress Abdomen: Soft, NT, ND MSK: Moving all extremities against gravity Neuro: Sensation I to LT Extremities: (-)Edema Derm: No Rash Psych: Calm, Cooperative

## 2025-06-27 NOTE — DATA REVIEWED
[FreeTextEntry1] : PROCEDURE: MRI LUMBAR SPINE  ORDER #: [id number] CC: ; ; ; End of cc's  CLINICAL INFORMATION: Lower back pain. Pain across back with thigh pain and groin pain down right leg  ADDITIONAL CLINICAL INFORMATION: Not Applicable  TECHNIQUE: Multiplanar, multisequence MRI was performed of the lumbar spine. IV Contrast: NONE  PRIOR STUDIES: No priors available for comparison.  FINDINGS:  LOCALIZER: No additional findings. BONES: The patient is status post posterior fusion of L4 and L5 with interbody fusion at L5. The patient is also status post bilateral L5 laminectomies. There is mild to moderate chronic Schmorl's node at the superior endplate of L3 vertebral body. There is no acute fracture or bone marrow edema. There is diffuse throughout the bone marrow that may represent osteopenia. ALIGNMENT: There is trace retrolisthesis of L3 on L4. There is trace anterolisthesis of L4 on L5.. Disc spaces: There is loss of T2 signal within the L2/L3 and L3/L4 and L5/S1 disc is consistent with mild desiccation. SACROILIAC JOINTS/SACRUM: There is no sacral fracture. The SI joints are partially visualized but are intact. There is vacuum phenomenon and mild anterior osteophyte formation consistent with mild degenerative changes of the bilateral SI joints. CONUS AND CAUDA EQUINA: The distal cord and conus are normal in signal. Conus terminates at L1. VISUALIZED INTRAPELVIC/INTRA-ABDOMINAL SOFT TISSUES: There are a few small cystic lesion seen at the inferior pole of the right kidney. PARASPINAL SOFT TISSUES: Normal.   INDIVIDUAL LEVELS:  LOWER THORACIC SPINE: No spinal canal or neuroforaminal stenosis.  L1-L2: No spinal canal or neuroforaminal stenosis. L2-L3: There is a mild diffuse disc bulge with a superimposed right subarticular foraminal and far lateral broad-based disc protrusion flattening the ventral thecal sac and contacting the right descending L3 nerve root. There is mild right narrowing of the neural foramen. There is narrowing of the right lateral recess. The left neuroforamen is patent. The bilateral exiting nerve roots appear within normal limits. The constellation of findings is causing mild spinal canal stenosis. L3-L4: There is trace retrolisthesis with a superimposed diffuse disc bulge flattening the ventral thecal sac and in close proximity to the right L3 foraminal exiting nerve root. There is moderate to severe left foraminal narrowing. There is severe right foraminal narrowing. Constellation of findings is causing moderate to severe spinal canal stenosis. L4-L5: There is minimal unroofing of the posterior disc space due to the trace anterolisthesis. There is a minimal diffuse disc bulge contacting the ventral thecal sac. The bilateral neuroforamen are patent. The bilateral L4 foraminal exiting nerve roots are within normal limits. There has been decompression at this level. L5-S1: There is a minimal diffuse disc bulge flattening the ventral thecal sac. There is mild-to-moderate bilateral foraminal narrowing. There is moderate facet and ligamentous hypertrophy. The constellation of findings is causing mild spinal canal stenosis.  No spinal canal or neuroforaminal stenosis.   IMPRESSION:  Status post posterior fusion of L4 and L5 with interbody fusion at L5.  Status post bilateral L5 laminectomies. Mild to moderate chronic Schmorl's node at the superior endplate of L3 vertebral body. No acute fracture or bone marrow edema. Degenerative changes of the lumbar spine most notable at L3/L4. L2-L3 mild diffuse disc bulge with a superimposed right subarticular foraminal and far lateral broad-based disc protrusion contacting the right descending L3 nerve root. L2/L3 mild spinal canal stenosis. L3-L4 diffuse disc bulge flattening the ventral thecal sac and in close proximity to the right L3 foraminal exiting nerve root. L3/L4 moderate to severe spinal canal stenosis. L4-L5 minimal diffuse disc. There has been decompression at this level. L5-S1 minimal diffuse disc bulge L5/S1 mild spinal canal stenosis.  --- End of Report ---   Electronically Signed: ____________________________________________